# Patient Record
Sex: MALE | Race: OTHER | HISPANIC OR LATINO | URBAN - METROPOLITAN AREA
[De-identification: names, ages, dates, MRNs, and addresses within clinical notes are randomized per-mention and may not be internally consistent; named-entity substitution may affect disease eponyms.]

---

## 2019-06-04 ENCOUNTER — EMERGENCY (EMERGENCY)
Facility: HOSPITAL | Age: 34
LOS: 1 days | Discharge: HOME | End: 2019-06-04
Attending: EMERGENCY MEDICINE
Payer: MEDICAID

## 2019-06-04 VITALS
RESPIRATION RATE: 18 BRPM | WEIGHT: 199.96 LBS | SYSTOLIC BLOOD PRESSURE: 141 MMHG | TEMPERATURE: 98 F | OXYGEN SATURATION: 97 % | DIASTOLIC BLOOD PRESSURE: 105 MMHG | HEIGHT: 68 IN | HEART RATE: 119 BPM

## 2019-06-04 VITALS
OXYGEN SATURATION: 98 % | DIASTOLIC BLOOD PRESSURE: 99 MMHG | RESPIRATION RATE: 18 BRPM | HEART RATE: 96 BPM | SYSTOLIC BLOOD PRESSURE: 149 MMHG

## 2019-06-04 LAB
ABO RH CONFIRMATION: SIGNIFICANT CHANGE UP
ALBUMIN SERPL ELPH-MCNC: 5.2 G/DL — SIGNIFICANT CHANGE UP (ref 3.3–5.2)
ALP SERPL-CCNC: 73 U/L — SIGNIFICANT CHANGE UP (ref 40–120)
ALT FLD-CCNC: 34 U/L — SIGNIFICANT CHANGE UP
AMPHET UR-MCNC: NEGATIVE — SIGNIFICANT CHANGE UP
ANION GAP SERPL CALC-SCNC: 14 MMOL/L — SIGNIFICANT CHANGE UP (ref 5–17)
APPEARANCE UR: CLEAR — SIGNIFICANT CHANGE UP
APTT BLD: 27.9 SEC — SIGNIFICANT CHANGE UP (ref 27.5–36.3)
AST SERPL-CCNC: 79 U/L — HIGH
BACTERIA # UR AUTO: ABNORMAL
BARBITURATES UR SCN-MCNC: NEGATIVE — SIGNIFICANT CHANGE UP
BASOPHILS # BLD AUTO: 0 K/UL — SIGNIFICANT CHANGE UP (ref 0–0.2)
BASOPHILS NFR BLD AUTO: 0.1 % — SIGNIFICANT CHANGE UP (ref 0–2)
BENZODIAZ UR-MCNC: NEGATIVE — SIGNIFICANT CHANGE UP
BILIRUB SERPL-MCNC: 1 MG/DL — SIGNIFICANT CHANGE UP (ref 0.4–2)
BILIRUB UR-MCNC: NEGATIVE — SIGNIFICANT CHANGE UP
BLD GP AB SCN SERPL QL: SIGNIFICANT CHANGE UP
BUN SERPL-MCNC: 16 MG/DL — SIGNIFICANT CHANGE UP (ref 8–20)
CALCIUM SERPL-MCNC: 10.3 MG/DL — HIGH (ref 8.6–10.2)
CHLORIDE SERPL-SCNC: 94 MMOL/L — LOW (ref 98–107)
CO2 SERPL-SCNC: 26 MMOL/L — SIGNIFICANT CHANGE UP (ref 22–29)
COCAINE METAB.OTHER UR-MCNC: NEGATIVE — SIGNIFICANT CHANGE UP
COLOR SPEC: YELLOW — SIGNIFICANT CHANGE UP
CREAT SERPL-MCNC: 0.83 MG/DL — SIGNIFICANT CHANGE UP (ref 0.5–1.3)
DIFF PNL FLD: ABNORMAL
EOSINOPHIL # BLD AUTO: 0 K/UL — SIGNIFICANT CHANGE UP (ref 0–0.5)
EOSINOPHIL NFR BLD AUTO: 0.2 % — SIGNIFICANT CHANGE UP (ref 0–5)
EPI CELLS # UR: SIGNIFICANT CHANGE UP
GLUCOSE SERPL-MCNC: 99 MG/DL — SIGNIFICANT CHANGE UP (ref 70–115)
GLUCOSE UR QL: NEGATIVE MG/DL — SIGNIFICANT CHANGE UP
HCT VFR BLD CALC: 43.9 % — SIGNIFICANT CHANGE UP (ref 42–52)
HGB BLD-MCNC: 15.7 G/DL — SIGNIFICANT CHANGE UP (ref 14–18)
INR BLD: 0.94 RATIO — SIGNIFICANT CHANGE UP (ref 0.88–1.16)
KETONES UR-MCNC: ABNORMAL
LEUKOCYTE ESTERASE UR-ACNC: NEGATIVE — SIGNIFICANT CHANGE UP
LYMPHOCYTES # BLD AUTO: 1 K/UL — SIGNIFICANT CHANGE UP (ref 1–4.8)
LYMPHOCYTES # BLD AUTO: 12.6 % — LOW (ref 20–55)
MCHC RBC-ENTMCNC: 32.2 PG — HIGH (ref 27–31)
MCHC RBC-ENTMCNC: 35.8 G/DL — SIGNIFICANT CHANGE UP (ref 32–36)
MCV RBC AUTO: 90 FL — SIGNIFICANT CHANGE UP (ref 80–94)
METHADONE UR-MCNC: NEGATIVE — SIGNIFICANT CHANGE UP
MONOCYTES # BLD AUTO: 0.6 K/UL — SIGNIFICANT CHANGE UP (ref 0–0.8)
MONOCYTES NFR BLD AUTO: 7.2 % — SIGNIFICANT CHANGE UP (ref 3–10)
NEUTROPHILS # BLD AUTO: 6.5 K/UL — SIGNIFICANT CHANGE UP (ref 1.8–8)
NEUTROPHILS NFR BLD AUTO: 79.7 % — HIGH (ref 37–73)
NITRITE UR-MCNC: NEGATIVE — SIGNIFICANT CHANGE UP
OPIATES UR-MCNC: NEGATIVE — SIGNIFICANT CHANGE UP
PCP SPEC-MCNC: SIGNIFICANT CHANGE UP
PCP UR-MCNC: NEGATIVE — SIGNIFICANT CHANGE UP
PH UR: 5 — SIGNIFICANT CHANGE UP (ref 5–8)
PLATELET # BLD AUTO: 94 K/UL — LOW (ref 150–400)
POTASSIUM SERPL-MCNC: 5.3 MMOL/L — SIGNIFICANT CHANGE UP (ref 3.5–5.3)
POTASSIUM SERPL-SCNC: 5.3 MMOL/L — SIGNIFICANT CHANGE UP (ref 3.5–5.3)
PROT SERPL-MCNC: 8.7 G/DL — SIGNIFICANT CHANGE UP (ref 6.6–8.7)
PROT UR-MCNC: 100 MG/DL
PROTHROM AB SERPL-ACNC: 10.8 SEC — SIGNIFICANT CHANGE UP (ref 10–12.9)
RBC # BLD: 4.88 M/UL — SIGNIFICANT CHANGE UP (ref 4.6–6.2)
RBC # FLD: 14.4 % — SIGNIFICANT CHANGE UP (ref 11–15.6)
RBC CASTS # UR COMP ASSIST: ABNORMAL /HPF (ref 0–4)
SODIUM SERPL-SCNC: 134 MMOL/L — LOW (ref 135–145)
SP GR SPEC: 1.02 — SIGNIFICANT CHANGE UP (ref 1.01–1.02)
THC UR QL: NEGATIVE — SIGNIFICANT CHANGE UP
TROPONIN T SERPL-MCNC: <0.01 NG/ML — SIGNIFICANT CHANGE UP (ref 0–0.06)
TYPE + AB SCN PNL BLD: SIGNIFICANT CHANGE UP
UROBILINOGEN FLD QL: 1 MG/DL
WBC # BLD: 8.1 K/UL — SIGNIFICANT CHANGE UP (ref 4.8–10.8)
WBC # FLD AUTO: 8.1 K/UL — SIGNIFICANT CHANGE UP (ref 4.8–10.8)
WBC UR QL: SIGNIFICANT CHANGE UP

## 2019-06-04 PROCEDURE — 86901 BLOOD TYPING SEROLOGIC RH(D): CPT

## 2019-06-04 PROCEDURE — 70450 CT HEAD/BRAIN W/O DYE: CPT

## 2019-06-04 PROCEDURE — 86850 RBC ANTIBODY SCREEN: CPT

## 2019-06-04 PROCEDURE — 85730 THROMBOPLASTIN TIME PARTIAL: CPT

## 2019-06-04 PROCEDURE — 36415 COLL VENOUS BLD VENIPUNCTURE: CPT

## 2019-06-04 PROCEDURE — 93005 ELECTROCARDIOGRAM TRACING: CPT

## 2019-06-04 PROCEDURE — 99284 EMERGENCY DEPT VISIT MOD MDM: CPT | Mod: 25

## 2019-06-04 PROCEDURE — 72125 CT NECK SPINE W/O DYE: CPT

## 2019-06-04 PROCEDURE — 72125 CT NECK SPINE W/O DYE: CPT | Mod: 26

## 2019-06-04 PROCEDURE — 85027 COMPLETE CBC AUTOMATED: CPT

## 2019-06-04 PROCEDURE — 96374 THER/PROPH/DIAG INJ IV PUSH: CPT | Mod: XU

## 2019-06-04 PROCEDURE — 86900 BLOOD TYPING SEROLOGIC ABO: CPT

## 2019-06-04 PROCEDURE — 12002 RPR S/N/AX/GEN/TRNK2.6-7.5CM: CPT

## 2019-06-04 PROCEDURE — 70450 CT HEAD/BRAIN W/O DYE: CPT | Mod: 26

## 2019-06-04 PROCEDURE — 93010 ELECTROCARDIOGRAM REPORT: CPT

## 2019-06-04 PROCEDURE — 80053 COMPREHEN METABOLIC PANEL: CPT

## 2019-06-04 PROCEDURE — 81001 URINALYSIS AUTO W/SCOPE: CPT

## 2019-06-04 PROCEDURE — 84484 ASSAY OF TROPONIN QUANT: CPT

## 2019-06-04 PROCEDURE — 85610 PROTHROMBIN TIME: CPT

## 2019-06-04 PROCEDURE — 90715 TDAP VACCINE 7 YRS/> IM: CPT

## 2019-06-04 PROCEDURE — 80307 DRUG TEST PRSMV CHEM ANLYZR: CPT

## 2019-06-04 PROCEDURE — 90471 IMMUNIZATION ADMIN: CPT

## 2019-06-04 RX ORDER — LEVETIRACETAM 250 MG/1
1000 TABLET, FILM COATED ORAL ONCE
Refills: 0 | Status: COMPLETED | OUTPATIENT
Start: 2019-06-04 | End: 2019-06-04

## 2019-06-04 RX ORDER — TETANUS TOXOID, REDUCED DIPHTHERIA TOXOID AND ACELLULAR PERTUSSIS VACCINE, ADSORBED 5; 2.5; 8; 8; 2.5 [IU]/.5ML; [IU]/.5ML; UG/.5ML; UG/.5ML; UG/.5ML
0.5 SUSPENSION INTRAMUSCULAR ONCE
Refills: 0 | Status: COMPLETED | OUTPATIENT
Start: 2019-06-04 | End: 2019-06-04

## 2019-06-04 RX ORDER — LEVETIRACETAM 250 MG/1
1 TABLET, FILM COATED ORAL
Qty: 20 | Refills: 0
Start: 2019-06-04 | End: 2019-06-13

## 2019-06-04 RX ORDER — LEVETIRACETAM 250 MG/1
1 TABLET, FILM COATED ORAL
Qty: 28 | Refills: 0
Start: 2019-06-04 | End: 2019-06-17

## 2019-06-04 RX ADMIN — LEVETIRACETAM 400 MILLIGRAM(S): 250 TABLET, FILM COATED ORAL at 19:52

## 2019-06-04 RX ADMIN — TETANUS TOXOID, REDUCED DIPHTHERIA TOXOID AND ACELLULAR PERTUSSIS VACCINE, ADSORBED 0.5 MILLILITER(S): 5; 2.5; 8; 8; 2.5 SUSPENSION INTRAMUSCULAR at 15:46

## 2019-06-04 NOTE — ED ADULT NURSE REASSESSMENT NOTE - NS ED NURSE REASSESS COMMENT FT1
assumed pt care 1930. pt resting in stretcher. vss. no complaints at this time. neuro assessment wnl. nih =0

## 2019-06-04 NOTE — ED PROVIDER NOTE - PROGRESS NOTE DETAILS
The patient will follow up with Dr Roldan tomorrow in the office No driving until get cleared by Neurology

## 2019-06-04 NOTE — ED PROVIDER NOTE - OBJECTIVE STATEMENT
The patient is a 34 year old male presents with a fall secondary to syncope vs seizure.  The witness states that he fell and had tonic clonic movement.  The patient denies prodrome of dizziness, nor feeling sick prior to fall.  Currently, the patient has mild headache. No neck pain, No CP, No SOB, No abd pain, No motor No sensory loss  Denies illicit drugs

## 2019-06-04 NOTE — ED PROVIDER NOTE - CLINICAL SUMMARY MEDICAL DECISION MAKING FREE TEXT BOX
The patient had a new onset seizure and CT head and lab WNL and will dc home on keppra and follow up tomorrow in Neurology clinic

## 2019-06-04 NOTE — ED PROVIDER NOTE - CHPI ED SYMPTOMS NEG
no change in level of consciousness/no vomiting/no nausea/no numbness/no confusion/no dizziness/no fever/no blurred vision/no weakness

## 2019-06-04 NOTE — ED PROVIDER NOTE - ENMT, MLM
Airway patent, Nasal mucosa clear. Mouth with normal mucosa. Throat has no vesicles, no oropharyngeal exudates and uvula is midline. Scalp lac seen in the occiput

## 2019-06-04 NOTE — ED ADULT TRIAGE NOTE - CHIEF COMPLAINT QUOTE
pt reports a fall from standing height while at work, bystanders witnessed brief clonic tonic activity as per EMS. pt arrive a&ox3, denies any hx cardiac, seizures. no blood thinners reported. as per EMS lac to posterior scalp.

## 2019-06-05 PROBLEM — Z00.00 ENCOUNTER FOR PREVENTIVE HEALTH EXAMINATION: Status: ACTIVE | Noted: 2019-06-05

## 2019-06-06 ENCOUNTER — APPOINTMENT (OUTPATIENT)
Dept: NEUROLOGY | Facility: CLINIC | Age: 34
End: 2019-06-06
Payer: MEDICAID

## 2019-06-06 VITALS
BODY MASS INDEX: 30.31 KG/M2 | WEIGHT: 200 LBS | SYSTOLIC BLOOD PRESSURE: 130 MMHG | DIASTOLIC BLOOD PRESSURE: 80 MMHG | HEIGHT: 68 IN

## 2019-06-06 DIAGNOSIS — R56.9 UNSPECIFIED CONVULSIONS: ICD-10-CM

## 2019-06-06 PROCEDURE — 99204 OFFICE O/P NEW MOD 45 MIN: CPT

## 2019-06-06 RX ORDER — LEVETIRACETAM 500 MG/1
500 TABLET, FILM COATED ORAL
Refills: 0 | Status: ACTIVE | COMMUNITY

## 2019-06-06 RX ORDER — LEVETIRACETAM 500 MG/1
500 TABLET, FILM COATED ORAL TWICE DAILY
Qty: 60 | Refills: 5 | Status: ACTIVE | COMMUNITY
Start: 2019-06-06 | End: 1900-01-01

## 2019-06-11 ENCOUNTER — EMERGENCY (EMERGENCY)
Facility: HOSPITAL | Age: 34
LOS: 1 days | Discharge: HOME | End: 2019-06-11
Attending: STUDENT IN AN ORGANIZED HEALTH CARE EDUCATION/TRAINING PROGRAM
Payer: MEDICAID

## 2019-06-11 VITALS
HEART RATE: 88 BPM | TEMPERATURE: 99 F | RESPIRATION RATE: 18 BRPM | SYSTOLIC BLOOD PRESSURE: 144 MMHG | DIASTOLIC BLOOD PRESSURE: 90 MMHG | OXYGEN SATURATION: 99 % | HEIGHT: 68 IN | WEIGHT: 199.96 LBS

## 2019-06-11 PROCEDURE — G0463: CPT

## 2019-06-12 NOTE — ED PROVIDER NOTE - ENMT, MLM
Airway patent. Nasal mucosa clear.  Mouth with normal mucosa. Neck supple, FROM. 5 removed from parietal scalp, site well appearing, no erythema, no discharge.

## 2019-06-12 NOTE — ED PROVIDER NOTE - OBJECTIVE STATEMENT
35 y/o M pt presents to the ED for staple removal.  Pt had staples placed to his parietal scalp June 4th, and was advised to return to the ED for staple removal.  Denies erythema, itching, drainage, fever, chills.  No further acute complaints at this time.

## 2019-06-18 ENCOUNTER — APPOINTMENT (OUTPATIENT)
Dept: NEUROLOGY | Facility: CLINIC | Age: 34
End: 2019-06-18

## 2019-07-23 ENCOUNTER — APPOINTMENT (OUTPATIENT)
Dept: NEUROLOGY | Facility: CLINIC | Age: 34
End: 2019-07-23

## 2019-08-05 PROBLEM — R56.9 SEIZURES: Status: ACTIVE | Noted: 2019-06-06

## 2023-07-28 ENCOUNTER — INPATIENT (INPATIENT)
Facility: HOSPITAL | Age: 38
LOS: 0 days | DRG: 871 | End: 2023-07-29
Attending: STUDENT IN AN ORGANIZED HEALTH CARE EDUCATION/TRAINING PROGRAM | Admitting: STUDENT IN AN ORGANIZED HEALTH CARE EDUCATION/TRAINING PROGRAM
Payer: COMMERCIAL

## 2023-07-28 PROCEDURE — 36556 INSERT NON-TUNNEL CV CATH: CPT

## 2023-07-28 PROCEDURE — 99291 CRITICAL CARE FIRST HOUR: CPT | Mod: 25

## 2023-07-28 PROCEDURE — 99292 CRITICAL CARE ADDL 30 MIN: CPT | Mod: 25

## 2023-07-29 VITALS — HEART RATE: 52 BPM | WEIGHT: 154.32 LBS

## 2023-07-29 DIAGNOSIS — I46.9 CARDIAC ARREST, CAUSE UNSPECIFIED: ICD-10-CM

## 2023-07-29 LAB
ABO RH CONFIRMATION: SIGNIFICANT CHANGE UP
ABO RH CONFIRMATION: SIGNIFICANT CHANGE UP
ALBUMIN SERPL ELPH-MCNC: 2.1 G/DL — LOW (ref 3.3–5.2)
ALBUMIN SERPL ELPH-MCNC: 2.1 G/DL — LOW (ref 3.3–5.2)
ALBUMIN SERPL ELPH-MCNC: 3.1 G/DL — LOW (ref 3.3–5.2)
ALBUMIN SERPL ELPH-MCNC: 3.1 G/DL — LOW (ref 3.3–5.2)
ALP SERPL-CCNC: 417 U/L — HIGH (ref 40–120)
ALP SERPL-CCNC: 417 U/L — HIGH (ref 40–120)
ALP SERPL-CCNC: 493 U/L — HIGH (ref 40–120)
ALP SERPL-CCNC: 493 U/L — HIGH (ref 40–120)
ALT FLD-CCNC: 105 U/L — HIGH
ALT FLD-CCNC: 105 U/L — HIGH
ALT FLD-CCNC: 257 U/L — HIGH
ALT FLD-CCNC: 257 U/L — HIGH
ANION GAP SERPL CALC-SCNC: 49 MMOL/L — HIGH (ref 5–17)
ANION GAP SERPL CALC-SCNC: 49 MMOL/L — HIGH (ref 5–17)
ANION GAP SERPL CALC-SCNC: SIGNIFICANT CHANGE UP MMOL/L (ref 5–17)
ANION GAP SERPL CALC-SCNC: SIGNIFICANT CHANGE UP MMOL/L (ref 5–17)
ANISOCYTOSIS BLD QL: SLIGHT — SIGNIFICANT CHANGE UP
APTT BLD: 66.7 SEC — HIGH (ref 24.5–35.6)
APTT BLD: 66.7 SEC — HIGH (ref 24.5–35.6)
APTT BLD: 93.7 SEC — HIGH (ref 24.5–35.6)
APTT BLD: 93.7 SEC — HIGH (ref 24.5–35.6)
AST SERPL-CCNC: 1113 U/L — HIGH
AST SERPL-CCNC: 1113 U/L — HIGH
AST SERPL-CCNC: 347 U/L — HIGH
AST SERPL-CCNC: 347 U/L — HIGH
BASOPHILS # BLD AUTO: 0 K/UL — SIGNIFICANT CHANGE UP (ref 0–0.2)
BASOPHILS NFR BLD AUTO: 0 % — SIGNIFICANT CHANGE UP (ref 0–2)
BILIRUB SERPL-MCNC: 1.8 MG/DL — SIGNIFICANT CHANGE UP (ref 0.4–2)
BILIRUB SERPL-MCNC: 1.8 MG/DL — SIGNIFICANT CHANGE UP (ref 0.4–2)
BILIRUB SERPL-MCNC: 2.2 MG/DL — HIGH (ref 0.4–2)
BILIRUB SERPL-MCNC: 2.2 MG/DL — HIGH (ref 0.4–2)
BLD GP AB SCN SERPL QL: SIGNIFICANT CHANGE UP
BLD GP AB SCN SERPL QL: SIGNIFICANT CHANGE UP
BLOOD GAS COMMENTS ARTERIAL: SIGNIFICANT CHANGE UP
BLOOD GAS COMMENTS ARTERIAL: SIGNIFICANT CHANGE UP
BUN SERPL-MCNC: 17.1 MG/DL — SIGNIFICANT CHANGE UP (ref 8–20)
BUN SERPL-MCNC: 17.1 MG/DL — SIGNIFICANT CHANGE UP (ref 8–20)
BUN SERPL-MCNC: 18.4 MG/DL — SIGNIFICANT CHANGE UP (ref 8–20)
BUN SERPL-MCNC: 18.4 MG/DL — SIGNIFICANT CHANGE UP (ref 8–20)
BURR CELLS BLD QL SMEAR: PRESENT — SIGNIFICANT CHANGE UP
CALCIUM SERPL-MCNC: 10 MG/DL — SIGNIFICANT CHANGE UP (ref 8.4–10.5)
CALCIUM SERPL-MCNC: 10 MG/DL — SIGNIFICANT CHANGE UP (ref 8.4–10.5)
CALCIUM SERPL-MCNC: 6.2 MG/DL — CRITICAL LOW (ref 8.4–10.5)
CALCIUM SERPL-MCNC: 6.2 MG/DL — CRITICAL LOW (ref 8.4–10.5)
CHLORIDE SERPL-SCNC: 80 MMOL/L — LOW (ref 96–108)
CHLORIDE SERPL-SCNC: 80 MMOL/L — LOW (ref 96–108)
CHLORIDE SERPL-SCNC: 85 MMOL/L — LOW (ref 96–108)
CHLORIDE SERPL-SCNC: 85 MMOL/L — LOW (ref 96–108)
CO2 SERPL-SCNC: 9 MMOL/L — CRITICAL LOW (ref 22–29)
CO2 SERPL-SCNC: 9 MMOL/L — CRITICAL LOW (ref 22–29)
CO2 SERPL-SCNC: <6 MMOL/L — CRITICAL LOW (ref 22–29)
CO2 SERPL-SCNC: <6 MMOL/L — CRITICAL LOW (ref 22–29)
CREAT SERPL-MCNC: 3.74 MG/DL — HIGH (ref 0.5–1.3)
CREAT SERPL-MCNC: 3.74 MG/DL — HIGH (ref 0.5–1.3)
CREAT SERPL-MCNC: 4.11 MG/DL — HIGH (ref 0.5–1.3)
CREAT SERPL-MCNC: 4.11 MG/DL — HIGH (ref 0.5–1.3)
EGFR: 18 ML/MIN/1.73M2 — LOW
EGFR: 18 ML/MIN/1.73M2 — LOW
EGFR: 20 ML/MIN/1.73M2 — LOW
EGFR: 20 ML/MIN/1.73M2 — LOW
EOSINOPHIL # BLD AUTO: 0 K/UL — SIGNIFICANT CHANGE UP (ref 0–0.5)
EOSINOPHIL # BLD AUTO: 0 K/UL — SIGNIFICANT CHANGE UP (ref 0–0.5)
EOSINOPHIL # BLD AUTO: 0.9 K/UL — HIGH (ref 0–0.5)
EOSINOPHIL # BLD AUTO: 0.9 K/UL — HIGH (ref 0–0.5)
EOSINOPHIL NFR BLD AUTO: 0 % — SIGNIFICANT CHANGE UP (ref 0–6)
EOSINOPHIL NFR BLD AUTO: 0 % — SIGNIFICANT CHANGE UP (ref 0–6)
EOSINOPHIL NFR BLD AUTO: 2.5 % — SIGNIFICANT CHANGE UP (ref 0–6)
EOSINOPHIL NFR BLD AUTO: 2.5 % — SIGNIFICANT CHANGE UP (ref 0–6)
ETHANOL SERPL-MCNC: <10 MG/DL — SIGNIFICANT CHANGE UP (ref 0–9)
ETHANOL SERPL-MCNC: <10 MG/DL — SIGNIFICANT CHANGE UP (ref 0–9)
GAS PNL BLDA: SIGNIFICANT CHANGE UP
GIANT PLATELETS BLD QL SMEAR: PRESENT — SIGNIFICANT CHANGE UP
GLUCOSE SERPL-MCNC: 310 MG/DL — HIGH (ref 70–99)
GLUCOSE SERPL-MCNC: 310 MG/DL — HIGH (ref 70–99)
GLUCOSE SERPL-MCNC: 440 MG/DL — HIGH (ref 70–99)
GLUCOSE SERPL-MCNC: 440 MG/DL — HIGH (ref 70–99)
HCT VFR BLD CALC: 34.6 % — LOW (ref 39–50)
HCT VFR BLD CALC: 34.6 % — LOW (ref 39–50)
HCT VFR BLD CALC: 42.8 % — SIGNIFICANT CHANGE UP (ref 39–50)
HCT VFR BLD CALC: 42.8 % — SIGNIFICANT CHANGE UP (ref 39–50)
HGB BLD-MCNC: 11 G/DL — LOW (ref 13–17)
HGB BLD-MCNC: 11 G/DL — LOW (ref 13–17)
HGB BLD-MCNC: 13.1 G/DL — SIGNIFICANT CHANGE UP (ref 13–17)
HGB BLD-MCNC: 13.1 G/DL — SIGNIFICANT CHANGE UP (ref 13–17)
INR BLD: 1.56 RATIO — HIGH (ref 0.85–1.18)
INR BLD: 1.56 RATIO — HIGH (ref 0.85–1.18)
INR BLD: 3.54 RATIO — HIGH (ref 0.85–1.18)
INR BLD: 3.54 RATIO — HIGH (ref 0.85–1.18)
LACTATE SERPL-SCNC: 31.9 MMOL/L — CRITICAL HIGH (ref 0.5–2)
LACTATE SERPL-SCNC: 31.9 MMOL/L — CRITICAL HIGH (ref 0.5–2)
LACTATE SERPL-SCNC: 32 MMOL/L — CRITICAL HIGH (ref 0.5–2)
LACTATE SERPL-SCNC: 32 MMOL/L — CRITICAL HIGH (ref 0.5–2)
LYMPHOCYTES # BLD AUTO: 10.61 K/UL — HIGH (ref 1–3.3)
LYMPHOCYTES # BLD AUTO: 10.61 K/UL — HIGH (ref 1–3.3)
LYMPHOCYTES # BLD AUTO: 22 % — SIGNIFICANT CHANGE UP (ref 13–44)
LYMPHOCYTES # BLD AUTO: 22 % — SIGNIFICANT CHANGE UP (ref 13–44)
LYMPHOCYTES # BLD AUTO: 3.07 K/UL — SIGNIFICANT CHANGE UP (ref 1–3.3)
LYMPHOCYTES # BLD AUTO: 3.07 K/UL — SIGNIFICANT CHANGE UP (ref 1–3.3)
LYMPHOCYTES # BLD AUTO: 8.5 % — LOW (ref 13–44)
LYMPHOCYTES # BLD AUTO: 8.5 % — LOW (ref 13–44)
MACROCYTES BLD QL: SLIGHT — SIGNIFICANT CHANGE UP
MAGNESIUM SERPL-MCNC: 4.3 MG/DL — HIGH (ref 1.6–2.6)
MAGNESIUM SERPL-MCNC: 4.3 MG/DL — HIGH (ref 1.6–2.6)
MANUAL SMEAR VERIFICATION: SIGNIFICANT CHANGE UP
MCHC RBC-ENTMCNC: 30.6 GM/DL — LOW (ref 32–36)
MCHC RBC-ENTMCNC: 30.6 GM/DL — LOW (ref 32–36)
MCHC RBC-ENTMCNC: 31.8 GM/DL — LOW (ref 32–36)
MCHC RBC-ENTMCNC: 31.8 GM/DL — LOW (ref 32–36)
MCHC RBC-ENTMCNC: 33.3 PG — SIGNIFICANT CHANGE UP (ref 27–34)
MCHC RBC-ENTMCNC: 33.3 PG — SIGNIFICANT CHANGE UP (ref 27–34)
MCHC RBC-ENTMCNC: 34 PG — SIGNIFICANT CHANGE UP (ref 27–34)
MCHC RBC-ENTMCNC: 34 PG — SIGNIFICANT CHANGE UP (ref 27–34)
MCV RBC AUTO: 106.8 FL — HIGH (ref 80–100)
MCV RBC AUTO: 106.8 FL — HIGH (ref 80–100)
MCV RBC AUTO: 108.9 FL — HIGH (ref 80–100)
MCV RBC AUTO: 108.9 FL — HIGH (ref 80–100)
METAMYELOCYTES # FLD: 1.7 % — HIGH (ref 0–0)
MONOCYTES # BLD AUTO: 1.51 K/UL — HIGH (ref 0–0.9)
MONOCYTES # BLD AUTO: 1.51 K/UL — HIGH (ref 0–0.9)
MONOCYTES # BLD AUTO: 1.64 K/UL — HIGH (ref 0–0.9)
MONOCYTES # BLD AUTO: 1.64 K/UL — HIGH (ref 0–0.9)
MONOCYTES NFR BLD AUTO: 3.4 % — SIGNIFICANT CHANGE UP (ref 2–14)
MONOCYTES NFR BLD AUTO: 3.4 % — SIGNIFICANT CHANGE UP (ref 2–14)
MONOCYTES NFR BLD AUTO: 4.2 % — SIGNIFICANT CHANGE UP (ref 2–14)
MONOCYTES NFR BLD AUTO: 4.2 % — SIGNIFICANT CHANGE UP (ref 2–14)
NEUTROPHILS # BLD AUTO: 29.35 K/UL — HIGH (ref 1.8–7.4)
NEUTROPHILS # BLD AUTO: 29.35 K/UL — HIGH (ref 1.8–7.4)
NEUTROPHILS # BLD AUTO: 34.71 K/UL — HIGH (ref 1.8–7.4)
NEUTROPHILS # BLD AUTO: 34.71 K/UL — HIGH (ref 1.8–7.4)
NEUTROPHILS NFR BLD AUTO: 68.6 % — SIGNIFICANT CHANGE UP (ref 43–77)
NEUTROPHILS NFR BLD AUTO: 68.6 % — SIGNIFICANT CHANGE UP (ref 43–77)
NEUTROPHILS NFR BLD AUTO: 79.7 % — HIGH (ref 43–77)
NEUTROPHILS NFR BLD AUTO: 79.7 % — HIGH (ref 43–77)
NEUTS BAND # BLD: 1.7 % — SIGNIFICANT CHANGE UP (ref 0–8)
NEUTS BAND # BLD: 1.7 % — SIGNIFICANT CHANGE UP (ref 0–8)
NEUTS BAND # BLD: 3.4 % — SIGNIFICANT CHANGE UP (ref 0–8)
NEUTS BAND # BLD: 3.4 % — SIGNIFICANT CHANGE UP (ref 0–8)
NRBC # BLD: 1 /100 — HIGH (ref 0–0)
NRBC # BLD: 1 /100 — HIGH (ref 0–0)
NT-PROBNP SERPL-SCNC: 4749 PG/ML — HIGH (ref 0–300)
NT-PROBNP SERPL-SCNC: 4749 PG/ML — HIGH (ref 0–300)
PCO2 BLDA: 46 MMHG — SIGNIFICANT CHANGE UP (ref 35–48)
PCO2 BLDA: 46 MMHG — SIGNIFICANT CHANGE UP (ref 35–48)
PH BLDA: <6.942 — CRITICAL LOW (ref 7.35–7.45)
PH BLDA: <6.942 — CRITICAL LOW (ref 7.35–7.45)
PHOSPHATE SERPL-MCNC: 13 MG/DL — HIGH (ref 2.4–4.7)
PHOSPHATE SERPL-MCNC: 13 MG/DL — HIGH (ref 2.4–4.7)
PLAT MORPH BLD: ABNORMAL
PLAT MORPH BLD: ABNORMAL
PLAT MORPH BLD: NORMAL — SIGNIFICANT CHANGE UP
PLAT MORPH BLD: NORMAL — SIGNIFICANT CHANGE UP
PLATELET # BLD AUTO: 174 K/UL — SIGNIFICANT CHANGE UP (ref 150–400)
PLATELET # BLD AUTO: 174 K/UL — SIGNIFICANT CHANGE UP (ref 150–400)
PLATELET # BLD AUTO: 259 K/UL — SIGNIFICANT CHANGE UP (ref 150–400)
PLATELET # BLD AUTO: 259 K/UL — SIGNIFICANT CHANGE UP (ref 150–400)
PO2 BLDA: 160 MMHG — HIGH (ref 83–108)
PO2 BLDA: 160 MMHG — HIGH (ref 83–108)
POIKILOCYTOSIS BLD QL AUTO: SLIGHT — SIGNIFICANT CHANGE UP
POLYCHROMASIA BLD QL SMEAR: SLIGHT — SIGNIFICANT CHANGE UP
POTASSIUM SERPL-MCNC: 3.4 MMOL/L — LOW (ref 3.5–5.3)
POTASSIUM SERPL-MCNC: 3.4 MMOL/L — LOW (ref 3.5–5.3)
POTASSIUM SERPL-MCNC: 4.2 MMOL/L — SIGNIFICANT CHANGE UP (ref 3.5–5.3)
POTASSIUM SERPL-MCNC: 4.2 MMOL/L — SIGNIFICANT CHANGE UP (ref 3.5–5.3)
POTASSIUM SERPL-SCNC: 3.4 MMOL/L — LOW (ref 3.5–5.3)
POTASSIUM SERPL-SCNC: 3.4 MMOL/L — LOW (ref 3.5–5.3)
POTASSIUM SERPL-SCNC: 4.2 MMOL/L — SIGNIFICANT CHANGE UP (ref 3.5–5.3)
POTASSIUM SERPL-SCNC: 4.2 MMOL/L — SIGNIFICANT CHANGE UP (ref 3.5–5.3)
PROT SERPL-MCNC: 4 G/DL — LOW (ref 6.6–8.7)
PROT SERPL-MCNC: 4 G/DL — LOW (ref 6.6–8.7)
PROT SERPL-MCNC: 6.2 G/DL — LOW (ref 6.6–8.7)
PROT SERPL-MCNC: 6.2 G/DL — LOW (ref 6.6–8.7)
PROTHROM AB SERPL-ACNC: 17.1 SEC — HIGH (ref 9.5–13)
PROTHROM AB SERPL-ACNC: 17.1 SEC — HIGH (ref 9.5–13)
PROTHROM AB SERPL-ACNC: 37.9 SEC — HIGH (ref 9.5–13)
PROTHROM AB SERPL-ACNC: 37.9 SEC — HIGH (ref 9.5–13)
RBC # BLD: 3.24 M/UL — LOW (ref 4.2–5.8)
RBC # BLD: 3.24 M/UL — LOW (ref 4.2–5.8)
RBC # BLD: 3.93 M/UL — LOW (ref 4.2–5.8)
RBC # BLD: 3.93 M/UL — LOW (ref 4.2–5.8)
RBC # FLD: 14.6 % — HIGH (ref 10.3–14.5)
RBC # FLD: 14.6 % — HIGH (ref 10.3–14.5)
RBC # FLD: 15.5 % — HIGH (ref 10.3–14.5)
RBC # FLD: 15.5 % — HIGH (ref 10.3–14.5)
RBC BLD AUTO: ABNORMAL
SAO2 % BLDA: 94.7 % — SIGNIFICANT CHANGE UP (ref 94–98)
SAO2 % BLDA: 94.7 % — SIGNIFICANT CHANGE UP (ref 94–98)
SMUDGE CELLS # BLD: PRESENT — SIGNIFICANT CHANGE UP
SMUDGE CELLS # BLD: PRESENT — SIGNIFICANT CHANGE UP
SODIUM SERPL-SCNC: 138 MMOL/L — SIGNIFICANT CHANGE UP (ref 135–145)
SODIUM SERPL-SCNC: 138 MMOL/L — SIGNIFICANT CHANGE UP (ref 135–145)
SODIUM SERPL-SCNC: 140 MMOL/L — SIGNIFICANT CHANGE UP (ref 135–145)
SODIUM SERPL-SCNC: 140 MMOL/L — SIGNIFICANT CHANGE UP (ref 135–145)
TROPONIN T SERPL-MCNC: 0.01 NG/ML — SIGNIFICANT CHANGE UP (ref 0–0.06)
TROPONIN T SERPL-MCNC: 0.01 NG/ML — SIGNIFICANT CHANGE UP (ref 0–0.06)
VARIANT LYMPHS # BLD: 0.9 % — SIGNIFICANT CHANGE UP (ref 0–6)
VARIANT LYMPHS # BLD: 0.9 % — SIGNIFICANT CHANGE UP (ref 0–6)
VARIANT LYMPHS # BLD: 1.7 % — SIGNIFICANT CHANGE UP (ref 0–6)
VARIANT LYMPHS # BLD: 1.7 % — SIGNIFICANT CHANGE UP (ref 0–6)
WBC # BLD: 36.06 K/UL — HIGH (ref 3.8–10.5)
WBC # BLD: 36.06 K/UL — HIGH (ref 3.8–10.5)
WBC # BLD: 48.21 K/UL — CRITICAL HIGH (ref 3.8–10.5)
WBC # BLD: 48.21 K/UL — CRITICAL HIGH (ref 3.8–10.5)
WBC # FLD AUTO: 36.06 K/UL — HIGH (ref 3.8–10.5)
WBC # FLD AUTO: 36.06 K/UL — HIGH (ref 3.8–10.5)
WBC # FLD AUTO: 48.21 K/UL — CRITICAL HIGH (ref 3.8–10.5)
WBC # FLD AUTO: 48.21 K/UL — CRITICAL HIGH (ref 3.8–10.5)

## 2023-07-29 PROCEDURE — 93010 ELECTROCARDIOGRAM REPORT: CPT

## 2023-07-29 PROCEDURE — 71045 X-RAY EXAM CHEST 1 VIEW: CPT | Mod: 26

## 2023-07-29 PROCEDURE — 70450 CT HEAD/BRAIN W/O DYE: CPT | Mod: 26

## 2023-07-29 PROCEDURE — 71250 CT THORAX DX C-: CPT | Mod: 26

## 2023-07-29 PROCEDURE — 99291 CRITICAL CARE FIRST HOUR: CPT | Mod: 25

## 2023-07-29 PROCEDURE — 74176 CT ABD & PELVIS W/O CONTRAST: CPT | Mod: 26

## 2023-07-29 RX ORDER — NOREPINEPHRINE BITARTRATE/D5W 8 MG/250ML
0.05 PLASTIC BAG, INJECTION (ML) INTRAVENOUS
Qty: 8 | Refills: 0 | Status: DISCONTINUED | OUTPATIENT
Start: 2023-07-29 | End: 2023-07-29

## 2023-07-29 RX ORDER — PANTOPRAZOLE SODIUM 20 MG/1
40 TABLET, DELAYED RELEASE ORAL ONCE
Refills: 0 | Status: DISCONTINUED | OUTPATIENT
Start: 2023-07-29 | End: 2023-07-29

## 2023-07-29 RX ORDER — EPINEPHRINE 0.3 MG/.3ML
0.3 INJECTION INTRAMUSCULAR; SUBCUTANEOUS
Qty: 4 | Refills: 0 | Status: DISCONTINUED | OUTPATIENT
Start: 2023-07-29 | End: 2023-07-29

## 2023-07-29 RX ORDER — VASOPRESSIN 20 [USP'U]/ML
0.04 INJECTION INTRAVENOUS
Qty: 40 | Refills: 0 | Status: DISCONTINUED | OUTPATIENT
Start: 2023-07-29 | End: 2023-07-29

## 2023-07-29 RX ORDER — NOREPINEPHRINE BITARTRATE/D5W 8 MG/250ML
0.05 PLASTIC BAG, INJECTION (ML) INTRAVENOUS
Qty: 32 | Refills: 0 | Status: DISCONTINUED | OUTPATIENT
Start: 2023-07-29 | End: 2023-07-29

## 2023-07-29 RX ORDER — SODIUM BICARBONATE 1 MEQ/ML
0.21 SYRINGE (ML) INTRAVENOUS
Qty: 150 | Refills: 0 | Status: DISCONTINUED | OUTPATIENT
Start: 2023-07-29 | End: 2023-07-29

## 2023-07-29 RX ORDER — SODIUM CHLORIDE 9 MG/ML
1000 INJECTION, SOLUTION INTRAVENOUS ONCE
Refills: 0 | Status: COMPLETED | OUTPATIENT
Start: 2023-07-29 | End: 2023-07-29

## 2023-07-29 RX ORDER — HYDROMORPHONE HYDROCHLORIDE 2 MG/ML
2 INJECTION INTRAMUSCULAR; INTRAVENOUS; SUBCUTANEOUS ONCE
Refills: 0 | Status: DISCONTINUED | OUTPATIENT
Start: 2023-07-29 | End: 2023-07-29

## 2023-07-29 RX ORDER — CEFTRIAXONE 500 MG/1
1000 INJECTION, POWDER, FOR SOLUTION INTRAMUSCULAR; INTRAVENOUS ONCE
Refills: 0 | Status: DISCONTINUED | OUTPATIENT
Start: 2023-07-29 | End: 2023-07-29

## 2023-07-29 RX ORDER — PIPERACILLIN AND TAZOBACTAM 4; .5 G/20ML; G/20ML
3.38 INJECTION, POWDER, LYOPHILIZED, FOR SOLUTION INTRAVENOUS EVERY 8 HOURS
Refills: 0 | Status: DISCONTINUED | OUTPATIENT
Start: 2023-07-29 | End: 2023-07-29

## 2023-07-29 RX ORDER — CHLORHEXIDINE GLUCONATE 213 G/1000ML
15 SOLUTION TOPICAL EVERY 12 HOURS
Refills: 0 | Status: DISCONTINUED | OUTPATIENT
Start: 2023-07-29 | End: 2023-07-29

## 2023-07-29 RX ORDER — EPINEPHRINE 0.3 MG/.3ML
0.05 INJECTION INTRAMUSCULAR; SUBCUTANEOUS
Qty: 8 | Refills: 0 | Status: DISCONTINUED | OUTPATIENT
Start: 2023-07-29 | End: 2023-07-29

## 2023-07-29 RX ORDER — CHLORHEXIDINE GLUCONATE 213 G/1000ML
1 SOLUTION TOPICAL
Refills: 0 | Status: DISCONTINUED | OUTPATIENT
Start: 2023-07-29 | End: 2023-07-29

## 2023-07-29 RX ORDER — NOREPINEPHRINE BITARTRATE/D5W 8 MG/250ML
0.05 PLASTIC BAG, INJECTION (ML) INTRAVENOUS
Qty: 16 | Refills: 0 | Status: DISCONTINUED | OUTPATIENT
Start: 2023-07-29 | End: 2023-07-29

## 2023-07-29 RX ORDER — LEVETIRACETAM 250 MG/1
1000 TABLET, FILM COATED ORAL EVERY 12 HOURS
Refills: 0 | Status: DISCONTINUED | OUTPATIENT
Start: 2023-07-29 | End: 2023-07-29

## 2023-07-29 RX ORDER — PHENYLEPHRINE HYDROCHLORIDE 10 MG/ML
1000 INJECTION INTRAVENOUS ONCE
Refills: 0 | Status: COMPLETED | OUTPATIENT
Start: 2023-07-29 | End: 2023-07-29

## 2023-07-29 RX ORDER — SODIUM BICARBONATE 1 MEQ/ML
50 SYRINGE (ML) INTRAVENOUS ONCE
Refills: 0 | Status: COMPLETED | OUTPATIENT
Start: 2023-07-29 | End: 2023-07-29

## 2023-07-29 RX ORDER — FENTANYL CITRATE 50 UG/ML
0.5 INJECTION INTRAVENOUS
Qty: 2500 | Refills: 0 | Status: DISCONTINUED | OUTPATIENT
Start: 2023-07-29 | End: 2023-07-29

## 2023-07-29 RX ADMIN — Medication 3.28 MICROGRAM(S)/KG/MIN: at 13:37

## 2023-07-29 RX ADMIN — Medication 3.28 MICROGRAM(S)/KG/MIN: at 11:55

## 2023-07-29 RX ADMIN — Medication 3.28 MICROGRAM(S)/KG/MIN: at 07:59

## 2023-07-29 RX ADMIN — SODIUM CHLORIDE 1000 MILLILITER(S): 9 INJECTION, SOLUTION INTRAVENOUS at 04:57

## 2023-07-29 RX ADMIN — Medication 3.28 MICROGRAM(S)/KG/MIN: at 05:45

## 2023-07-29 RX ADMIN — EPINEPHRINE 78.8 MICROGRAM(S)/KG/MIN: 0.3 INJECTION INTRAMUSCULAR; SUBCUTANEOUS at 05:02

## 2023-07-29 RX ADMIN — Medication 50 MILLIEQUIVALENT(S): at 00:20

## 2023-07-29 RX ADMIN — Medication 3.28 MICROGRAM(S)/KG/MIN: at 19:50

## 2023-07-29 RX ADMIN — HYDROMORPHONE HYDROCHLORIDE 2 MILLIGRAM(S): 2 INJECTION INTRAMUSCULAR; INTRAVENOUS; SUBCUTANEOUS at 07:59

## 2023-07-29 RX ADMIN — FENTANYL CITRATE 3.5 MICROGRAM(S)/KG/HR: 50 INJECTION INTRAVENOUS at 11:55

## 2023-07-29 RX ADMIN — LEVETIRACETAM 400 MILLIGRAM(S): 250 TABLET, FILM COATED ORAL at 01:15

## 2023-07-29 RX ADMIN — VASOPRESSIN 6 UNIT(S)/MIN: 20 INJECTION INTRAVENOUS at 16:23

## 2023-07-29 RX ADMIN — HYDROMORPHONE HYDROCHLORIDE 2 MILLIGRAM(S): 2 INJECTION INTRAMUSCULAR; INTRAVENOUS; SUBCUTANEOUS at 08:15

## 2023-07-29 RX ADMIN — Medication 3.28 MICROGRAM(S)/KG/MIN: at 06:27

## 2023-07-29 RX ADMIN — PIPERACILLIN AND TAZOBACTAM 25 GRAM(S): 4; .5 INJECTION, POWDER, LYOPHILIZED, FOR SOLUTION INTRAVENOUS at 13:37

## 2023-07-29 RX ADMIN — PHENYLEPHRINE HYDROCHLORIDE 1000 MICROGRAM(S): 10 INJECTION INTRAVENOUS at 00:10

## 2023-07-29 RX ADMIN — CHLORHEXIDINE GLUCONATE 15 MILLILITER(S): 213 SOLUTION TOPICAL at 05:46

## 2023-07-29 RX ADMIN — Medication 3.28 MICROGRAM(S)/KG/MIN: at 16:23

## 2023-07-29 RX ADMIN — VASOPRESSIN 6 UNIT(S)/MIN: 20 INJECTION INTRAVENOUS at 13:37

## 2023-07-29 RX ADMIN — PIPERACILLIN AND TAZOBACTAM 25 GRAM(S): 4; .5 INJECTION, POWDER, LYOPHILIZED, FOR SOLUTION INTRAVENOUS at 05:45

## 2023-07-29 RX ADMIN — CHLORHEXIDINE GLUCONATE 15 MILLILITER(S): 213 SOLUTION TOPICAL at 16:22

## 2023-07-29 RX ADMIN — Medication 6.56 MICROGRAM(S)/KG/MIN: at 01:16

## 2023-07-29 RX ADMIN — Medication 3.28 MICROGRAM(S)/KG/MIN: at 04:57

## 2023-07-29 RX ADMIN — Medication 100 MEQ/KG/HR: at 04:57

## 2023-07-29 RX ADMIN — LEVETIRACETAM 400 MILLIGRAM(S): 250 TABLET, FILM COATED ORAL at 17:28

## 2023-07-29 RX ADMIN — CHLORHEXIDINE GLUCONATE 1 APPLICATION(S): 213 SOLUTION TOPICAL at 05:45

## 2023-07-29 NOTE — ED PROCEDURE NOTE - CPROC ED TIME OUT STATEMENT1
“Patient's name, , procedure and correct site were confirmed during the [place] Timeout.” “Patient's name, , procedure and correct site were confirmed during the [place] Timeout.”

## 2023-07-29 NOTE — INITIAL ORGAN DONATION REFERRAL - NSLIVEONNYREPRESENTATIVENAME
[id number] [facility] [id number] [facility]

## 2023-07-29 NOTE — ED PROVIDER NOTE - ATTENDING CONTRIBUTION TO CARE
I have personally provided __110_ minutes of critical care time exclusive of time spent on separately billable procedures. Time includes review of laboratory data, radiology results, discussion with consultants, and monitoring for potential decompensation. Interventions were performed as documented above     I personally saw the patient with the resident, and completed the key components of the history and physical exam. I then discussed the management plan with the resident.

## 2023-07-29 NOTE — ED CLERICAL - NS ED CLERK UNITS
MICU 3102-01 [facility] 3102-01 [facility]

## 2023-07-29 NOTE — ED PROVIDER NOTE - PROGRESS NOTE DETAILS
Patient presented to ED with Mike duenas in cardiopulmonary arrest found in cardiac arrest by EMS it was a witnessed arrest by patient's family member that lives with him he was vomiting throughout the day suddenly collapsed they called 911 per EMS he had return of spontaneous circulation 2 times in the ambulance patient received 7  rounds of epinephrine in the field no epi given ED return of circulation was achieved patient came in intubated central line placed A-line placed OG tube placed spoke with family history of epilepsy and alcohol abuse OG tube showing gross blood severely acidotic I updated family in particular patient's mother on patient's severe critical condition pupils remain fixed cardiac ultrasound shows no pericardial effusion no gross hypokinesis EKG right bundle branch block no overt ST elevation patient continues to be critically ill will call MICU for consult when blood work is back Patient is showing signs of DIC has epistaxis left subclavian central line appears to be clogged right IJ central line placed giving blood remains hypotensive ICU at bedside

## 2023-07-29 NOTE — ED PROVIDER NOTE - PHYSICAL EXAMINATION
General: unresponsive  Head: NC, AT  EENT: 3mm pupils fixed b/l   Cardiac: no pulses, no cardac activity   Respiratory: respiratory arrest  Abdomen: softly distended abdomen, nonperitonitic  MSK/Vascular: cool cyanotic extremities  Neuro: Unresponsive, GCS 3, no withdrawal to noxious stimuli, no spontaneous movements

## 2023-07-29 NOTE — H&P ADULT - MENTAL STATUS
unresponsive to verbal or noxious stimuli, does not withdraw to pain, no cough/gag reflex, no spontaneous movements, DOES overbreathe ventilator

## 2023-07-29 NOTE — H&P ADULT - HISTORY OF PRESENT ILLNESS
41 y/o M with a h/o epilepsy, ETOH abuse, presents to the ED via EMS after experiencing sudden witnessed cardiac arrest at home. The patient's aunt's boyfriend reports that the patient had stopped drinking alcohol 2 days prior and had been experiencing recurrent vomiting episodes since, and today he suddenly collapsed prompting the 911 call. The aunt's boyfriend began CPR and EMS took over upon arrival. There was brief ROSC en route to [facility] however CPR/ACLS continued in ED with eventual ROSC. Total downtime approx 40 mins. Profound shock state now requiring triple IV vasopressor therapy. MSOF. DIC with active hemorrhage from multiple sites. Poor mental status off sedation. 39 y/o M with a h/o epilepsy, ETOH abuse, presents to the ED via EMS after experiencing sudden witnessed cardiac arrest at home. The patient's aunt's boyfriend reports that the patient had stopped drinking alcohol 2 days prior and had been experiencing recurrent vomiting episodes since, and today he suddenly collapsed prompting the 911 call. The aunt's boyfriend began CPR and EMS took over upon arrival. There was brief ROSC en route to [facility] however CPR/ACLS continued in ED with eventual ROSC. Total downtime approx 40 mins. Profound shock state now requiring triple IV vasopressor therapy. MSOF. DIC with active hemorrhage from multiple sites. Poor mental status off sedation.

## 2023-07-29 NOTE — DISCHARGE NOTE FOR THE EXPIRED PATIENT - HOSPITAL COURSE
39 yo m pmhx epilepsy and ETOH abuse biba s/p prolonged ooh cardiac arrest.  Patient had stopped drinking alcohol 2 days prior, had been vomiting at home, then suddenly collapsed at home, total down time ~40 minutes.  Patient admitted to MICU with hypoxic respiratory failure, profound triple pressor shock, multisystem organ failure, global anoxic brain injury, goc discussion held with patient's family, patient transitioned to DNR/no escalation of care.  This evening patient deteriorated to asystole and was pronounced.   37 yo m pmhx epilepsy and ETOH abuse biba s/p prolonged ooh cardiac arrest.  Patient had stopped drinking alcohol 2 days prior, had been vomiting at home, then suddenly collapsed at home, total down time ~40 minutes.  Patient admitted to MICU with hypoxic respiratory failure, profound triple pressor shock, multisystem organ failure, global anoxic brain injury, goc discussion held with patient's family, patient transitioned to DNR/no escalation of care.  This evening patient deteriorated to asystole and was pronounced.

## 2023-07-29 NOTE — ED ADULT TRIAGE NOTE - CHIEF COMPLAINT QUOTE
Pt. BIBA in cardiac arrest. Pt. was found by family, was fine earlier today. Pt. received 8 epis in field, ROSC achieved x2, lost pulse PTA. PT. intubated in field, jace in place, IO left humerus.  Pt. brought to CC, MD Gupta at bedside.

## 2023-07-29 NOTE — PROGRESS NOTE ADULT - CRITICAL CARE ATTENDING COMMENT
Critical care time spent titrating IV sedatives, vasoactive medications, adjusting mechanical ventilator settings, interpreting blood gases and chest imaging.

## 2023-07-29 NOTE — ED ADULT NURSE NOTE - OBJECTIVE STATEMENT
Pt. BIBA for cardiac arrest. Pt. found down by family, 8epis in field, ROSC x2, then ROSC on arrival to ER. Pt. intubated PTA, OG tube put in with dark and bloody GI contents. Unable to advance hansen. Pt. originally hypotensive and bradycardiac on ROSC. Hx of etoh abuse and seizures.

## 2023-07-29 NOTE — ED PROVIDER NOTE - OBJECTIVE STATEMENT
40-year-old male brought into EMS after witnessed cardiac arrest at home.  On route to hospital ROSC was achieved however however pulses were subsequently lost, requiring further CPR/ACLS.

## 2023-07-29 NOTE — H&P ADULT - NSHPSOCIALHISTORY_GEN_ALL_CORE
Has 2 children (12 and 17 y/o) with girlfriend who lives in [place]. Has 2 children (12 and 15 y/o) with girlfriend who lives in [place].

## 2023-07-29 NOTE — H&P ADULT - NS PANP COMMENT GEN_ALL_CORE FT
41 y/o M with history of epilepsy, was found down at home, cardiac arrest, intubated on the field, total down time > 40 minutes combined field time + ED time.  Patient now in multiple organ failure on 3 pressors max doses, profound acidemia despite multiple bicarb pushes and drip, CT head shows diffuse anoxic changes.  Labs also significant for DIC with fibrinogen down to 152, and has bleeding from multiple sites.  Lactate > 20 on blood gas.  Too unstable for HD.  Family updated, still full code for now.  prognosis is very poor at this time.  Will continue current measure and continue emotional support to the family.  Rest of plan per CASE [facility].

## 2023-07-29 NOTE — GOALS OF CARE CONVERSATION - ADVANCED CARE PLANNING - CONVERSATION DETAILS
Updated in detail on condition  Explained grim prognosis, that patient may die despite our interventions, and that CPR would be of not benefit if he arrested despite high dose pressors.  Agreed to DNR, no escalation.   Also explained that in the event he did survive, he would be left with permanent neurologic injury.

## 2023-07-29 NOTE — PATIENT PROFILE ADULT - NSPROPOAURINARYCATHETER_GEN_A_NUR
This note was copied from a baby's chart. Lactation Progress Note  Initial Consult    Data: Referral received per RN. Action: LC to room. Mother resting in bed. Infant sleeping, swaddled in mother's arms, showing no hunger cues at this time. Mother states agreeable to consult from [facility] at this time. I reviewed Care Plan for First 24 Hours of Life already in patient binder. Discussed recognizing hunger cues and offering the breast when cues are shown. Encouraged breastfeeding on demand and attempting/offering at least every 3 hours. Informed infant may have one 5 hour stretch of sleep in a 24 hour period. Encouraged unlimited skin to skin contact with infant and reviewed benefits including better temperature, heart rate, respiration, blood pressure, and blood sugar regulation. Also increased bonding and milk supply associated with skin to skin contact. Discussed feeding positions, latch on techniques, signs of milk transfer, output goals and normal feeding/sleeping behaviors. I referred mother to binder for additional information about breastfeeding and skin to skin contact. Discussed hand expression and encouraged mother to practice getting drops to infant today. Mother is also pumping after feedings to help establish milk supply. Mother has hx of low milk supply issues with previous child. Education and support given for mother's plan to breastfeed and pump. Reinforced importance of positioning infant nose to nipple, belly to belly, waiting for wide open mouth, and bringing baby onto breast to ensure a deep latch. Discussed importance of obtaining deep latch to ensure proper milk transfer, milk production and supply and maternal comfort. Mother already has a new breast pump for home use. Gave breastfeeding booklet along with additional resources for after discharge.     I wrote my name and circled the phone number on patient's whiteboard, provided a lactation consultant business card, directed mother to [facility] for evidence based information, and encouraged mother to call with any lactation needs. Response: Mother verbalizes understanding of information given and denies further needs at this time. no

## 2023-07-29 NOTE — PATIENT PROFILE ADULT - FALL HARM RISK - HARM RISK INTERVENTIONS
Assistance with ambulation/Assistance OOB with selected safe patient handling equipment/Communicate Risk of Fall with Harm to all staff/Reinforce activity limits and safety measures with patient and family/Tailored Fall Risk Interventions/Visual Cue: Yellow wristband and red socks/Bed in lowest position, wheels locked, appropriate side rails in place/Call bell, personal items and telephone in reach/Instruct patient to call for assistance before getting out of bed or chair/Non-slip footwear when patient is out of bed/[place] to call system/Physically safe environment - no spills, clutter or unnecessary equipment/Purposeful Proactive Rounding/Room/bathroom lighting operational, light cord in reach Assistance with ambulation/Assistance OOB with selected safe patient handling equipment/Communicate Risk of Fall with Harm to all staff/Reinforce activity limits and safety measures with patient and family/Tailored Fall Risk Interventions/Visual Cue: Yellow wristband and red socks/Bed in lowest position, wheels locked, appropriate side rails in place/Call bell, personal items and telephone in reach/Instruct patient to call for assistance before getting out of bed or chair/Non-slip footwear when patient is out of bed/[place] to call system/Physically safe environment - no spills, clutter or unnecessary equipment/Purposeful Proactive Rounding/Room/bathroom lighting operational, light cord in reach

## 2023-07-29 NOTE — INITIAL ORGAN DONATION REFERRAL - NSORGANDONATIONCLINICALTRIGGER_GEN_ALL_CORE
[place] Coma Scale is less than or equal to 5/Family discussion withdrawal of life-sustaining therapies is anticipated [place] Coma Scale is less than or equal to 5/Family discussion withdrawal of life-sustaining therapies is anticipated

## 2023-07-29 NOTE — ED PROCEDURE NOTE - CPROC ED TIME OUT STATEMENT1
“Patient's name, , procedure and correct site were confirmed during the [place] Timeout.” “Patient's name, , procedure and correct site were confirmed during the [place] Timeout.”

## 2023-07-29 NOTE — H&P ADULT - ASSESSMENT
39 y/o M with a h/o epilepsy, ETOH abuse, with:    # Cardiac arrest  # Distributive shock  # DIC  # LORI  # Lactic acidosis    Admit to MICU.    Unclear etiology of cardiac arrest. Story is a bit unclear, however sounds rather sudden. Possible alcohol withdrawal seizure as patient stopped daily drinking 2 days prior? Check urine toxicology.    - unofficial bedside POCUS appears to reveal hyperdynamic LVEF, shock state likely related to massive SIRS response s/p prolonged cardiac arrest  - actively titrating norepi and epi infusions to maintain a MAP > 65  - maintain fixed dose vasopressin  - lactate > 20  - bolus 1L LR x 1  - transfused 2u PRBC emergently and will give 1 pool of cryoprecipitate now (fibrinogen 152)  - hold anticoagulation  - s/p numerous amps of sodium bicarbonate for metabolic acidosis, start infusion  - LORI secondary to ischemic ATN, optimize end-organ perfusion as above  - trend BUN/Cr, electrolytes, acid-base balance, and monitor hourly UOP  - no indication for urgent RRT and would likely be a poor candidate given profound hemodynamic instability  - actively titrating ventilator settings to maintain SpO2 > 92% and adequate minute ventilation  - RR increased to 28 bpm for compensatory purposes  - CT chest reveals bibasilar lung infiltrates/atelectasis, CT brain/chest/abdomen/pelvis formal report pending  - blood cultures sent, start empiric course of Zosyn for now  - strong suspicion for anoxic brain injury, no TTM in setting of active bleeding and severe acidosis, avoid hyperthermia  - avoid sedation as best as possible to accurately assess neuro exam    Discussed case extensively with the patient's mother, aunt, and aunt's boyfriend. I explained the gravity of the situation and the tremendous amount of life support he is requiring. They understand he is at high risk for recurrent cardiac arrest and overall grim prognosis for meaningful recovery. The patient's mother is unable to decide on goals of care at this time as she is overwhelmed and beginning to not feel well. Will proceed without limitations to care.    Case discussed with MICU physician, Dr. Keen.        CRITICAL CARE TIME SPENT: 120 mins  Time spent evaluating/treating patient with medical issues that pose a high risk for life threatening deterioration and/or end-organ damage, reviewing data/labs/imaging, discussing case with multidisciplinary team, discussing plan/goals of care with patient/family. Non-inclusive of procedure time.     41 y/o M with a h/o epilepsy, ETOH abuse, with:    # Cardiac arrest  # Distributive shock  # DIC  # LORI  # Lactic acidosis    Admit to MICU.    Unclear etiology of cardiac arrest. Story is a bit unclear, however sounds rather sudden. Possible alcohol withdrawal seizure as patient stopped daily drinking 2 days prior? Check urine toxicology.    - unofficial bedside POCUS appears to reveal hyperdynamic LVEF, shock state likely related to massive SIRS response s/p prolonged cardiac arrest  - actively titrating norepi and epi infusions to maintain a MAP > 65  - maintain fixed dose vasopressin  - lactate > 20  - bolus 1L LR x 1  - transfused 2u PRBC emergently and will give 1 pool of cryoprecipitate now (fibrinogen 152)  - hold anticoagulation  - s/p numerous amps of sodium bicarbonate for metabolic acidosis, start infusion  - LORI secondary to ischemic ATN, optimize end-organ perfusion as above  - trend BUN/Cr, electrolytes, acid-base balance, and monitor hourly UOP  - no indication for urgent RRT and would likely be a poor candidate given profound hemodynamic instability  - actively titrating ventilator settings to maintain SpO2 > 92% and adequate minute ventilation  - RR increased to 28 bpm for compensatory purposes  - CT chest reveals bibasilar lung infiltrates/atelectasis, CT brain/chest/abdomen/pelvis formal report pending  - blood cultures sent, start empiric course of Zosyn for now  - strong suspicion for anoxic brain injury, no TTM in setting of active bleeding and severe acidosis, avoid hyperthermia  - avoid sedation as best as possible to accurately assess neuro exam    Discussed case extensively with the patient's mother, aunt, and aunt's boyfriend. I explained the gravity of the situation and the tremendous amount of life support he is requiring. They understand he is at high risk for recurrent cardiac arrest and overall grim prognosis for meaningful recovery. The patient's mother is unable to decide on goals of care at this time as she is overwhelmed and beginning to not feel well. Will proceed without limitations to care.    Case discussed with MICU physician, Dr. Keen.        CRITICAL CARE TIME SPENT: 120 mins  Time spent evaluating/treating patient with medical issues that pose a high risk for life threatening deterioration and/or end-organ damage, reviewing data/labs/imaging, discussing case with multidisciplinary team, discussing plan/goals of care with patient/family. Non-inclusive of procedure time.

## 2023-07-29 NOTE — ED PROCEDURE NOTE - CPROC ED INDICATIONS1
emergency venous access/volume resuscitation
emergency venous access/volume resuscitation
arterial puncture to obtain ABG's/critical patient/monitoring purposes

## 2023-07-29 NOTE — ED PROVIDER NOTE - CLINICAL SUMMARY MEDICAL DECISION MAKING FREE TEXT BOX
40-year-old male brought in by EMS found unresponsive at home.  Chart review shows patient's name is Avinash Vincent, hx of alcohol abuse and seizures.  ROSC achieved after 2 rounds of CPR. Pt intubated prior to arrival. OG tube placed shows coffee-ground emesis.  Concern for GI bleed.    Likely has anoxic brain injury secondary to extended time down. Subclavian central line, right radial A-line placed for hemodynamic monitoring. MICU consulted, prognosis very poor.

## 2023-07-29 NOTE — ED PROCEDURE NOTE - CPROC ED INFUS LINE DETAIL1
The location was identified, and the area was draped and prepped./The catheter was placed using sterile technique./Ultrasound guidance was used./The guidewire was recovered./All lumen(s) aspirated and flushed without difficulty.
The location was identified, and the area was draped and prepped./The catheter was placed using sterile technique./The guidewire was recovered./All lumen(s) aspirated and flushed without difficulty.

## 2023-07-29 NOTE — PROGRESS NOTE ADULT - ASSESSMENT
37 yo male with hx of seizures, ETOH abuse, admitted with an out of hospital cardiac arrest, recent vomiting likely due to pancreatitis.  Patient now with anoxic encephalopathy, acute respiratory failure, LORI, DIC, metabolic acidosis.    Patient is actively dying despite all of our interventions.  Etiology of arrest ins unclear, however he has pancreatitis on CT and reportedly stopped drinking several days prior to presentation, may have had an ETOH withdrawal seizure.      Plan    Shock  - currently on high dose norepi, epi, fixed dose vaso, increasing any further would be futile  -  appears to have some upper GI bleed, but Hb is fairly stable and this does not appear to be hemorrhagic shock  - BP progressively dropping    Acute respiratory failure  - lung protective ventilation with low Vt and high RR to compensate for acidosis  - requiring high fio2  - empiric abx for aspiration     Anoxic encephalopathy secondary to prolonged cardiac arrest  - global injury pattern on CT  - poor neurologic prognosis, will likely progress to brain death if cardiac survival    Sedation:  fentanyl drip for comfort  Family updated

## 2023-07-29 NOTE — DISCHARGE NOTE FOR THE EXPIRED PATIENT - SECONDARY DIAGNOSIS.
Anoxic brain damage Lactic acidosis Pancreatitis Hemorrhagic shock DIC syndrome LORI (acute kidney injury) Sepsis with acute hypoxic respiratory failure

## 2023-07-29 NOTE — ED ADULT NURSE NOTE - NSFALLRISKINTERV_ED_ALL_ED
Assistance OOB with selected safe patient handling equipment if applicable/Communicate fall risk and risk factors to all staff, patient, and family/Encourage patient to sit up slowly, dangle for a short time, stand at bedside before walking/Provide visual cue: yellow wristband, yellow gown, etc/Reinforce activity limits and safety measures with patient and family/Review medications for side effects contributing to fall risk/Toileting schedule using arm’s reach rule for commode and bathroom/Call bell, personal items and telephone in reach/Instruct patient to call for assistance before getting out of bed/chair/stretcher/Non-slip footwear applied when patient is off stretcher/[place] to call system/Physically safe environment - no spills, clutter or unnecessary equipment/Purposeful Proactive Rounding/Room/bathroom lighting operational, light cord in reach Assistance OOB with selected safe patient handling equipment if applicable/Communicate fall risk and risk factors to all staff, patient, and family/Encourage patient to sit up slowly, dangle for a short time, stand at bedside before walking/Provide visual cue: yellow wristband, yellow gown, etc/Reinforce activity limits and safety measures with patient and family/Review medications for side effects contributing to fall risk/Toileting schedule using arm’s reach rule for commode and bathroom/Call bell, personal items and telephone in reach/Instruct patient to call for assistance before getting out of bed/chair/stretcher/Non-slip footwear applied when patient is off stretcher/[place] to call system/Physically safe environment - no spills, clutter or unnecessary equipment/Purposeful Proactive Rounding/Room/bathroom lighting operational, light cord in reach

## 2023-07-29 NOTE — PROGRESS NOTE ADULT - SUBJECTIVE AND OBJECTIVE BOX
Interval HPI:  BP and acidosis worsening despite high dose pressors    Exam:  Unresponsive, no purposeful movements,  pupils fixed  intubated  bilat air entry  bleeding from multiple sites    Radiology: CT chest- aspiration pneumonia    On Admission  07-29-23  HPI:  41 y/o M with a h/o epilepsy, ETOH abuse, presents to the ED via EMS after experiencing sudden witnessed cardiac arrest at home. The patient's aunt's boyfriend reports that the patient had stopped drinking alcohol 2 days prior and had been experiencing recurrent vomiting episodes since, and today he suddenly collapsed prompting the 911 call. The aunt's boyfriend began CPR and EMS took over upon arrival. There was brief ROSC en route to [facility] however CPR/ACLS continued in ED with eventual ROSC. Total downtime approx 40 mins. Profound shock state now requiring triple IV vasopressor therapy. MSOF. DIC with active hemorrhage from multiple sites. Poor mental status off sedation. (29 Jul 2023 03:44)    PAST MEDICAL & SURGICAL HISTORY:  ETOH abuse      Epilepsy          Antimicrobial:  piperacillin/tazobactam IVPB.. 3.375 Gram(s) IV Intermittent every 8 hours    Cardiovascular:  EPINEPHrine    Infusion 0.3 MICROgram(s)/kG/Min IV Continuous <Continuous>  norepinephrine Infusion 0.05 MICROgram(s)/kG/Min IV Continuous <Continuous>    Pulmonary:    Hematalogic:    Other:  chlorhexidine 0.12% Liquid 15 milliLiter(s) Oral Mucosa every 12 hours  chlorhexidine 2% Cloths 1 Application(s) Topical <User Schedule>  fentaNYL   Infusion. 0.5 MICROgram(s)/kG/Hr IV Continuous <Continuous>  levETIRAcetam  IVPB 1000 milliGRAM(s) IV Intermittent every 12 hours  pantoprazole  Injectable 40 milliGRAM(s) IV Push Once  vasopressin Infusion 0.04 Unit(s)/Min IV Continuous <Continuous>      Drug Dosing Weight    Weight (kg): 70 (29 Jul 2023 00:09)    T(C): 35.5 (07-29-23 @ 16:00), Max: 37 (07-29-23 @ 07:15)  HR: 100 (07-29-23 @ 16:15)  BP: 63/50 (07-29-23 @ 15:30)  BP(mean): 56 (07-29-23 @ 15:30)  ABP: 42/21 (07-29-23 @ 16:15)  ABP(mean): 29 (07-29-23 @ 16:15)  RR: 20 (07-29-23 @ 16:15)  SpO2: 100% (07-29-23 @ 16:15)    ABG - ( 29 Jul 2023 02:43 )  pH, Arterial: 7.160 pH, Blood: x     /  pCO2: 30    /  pO2: 241   / HCO3: 11    / Base Excess: -18.0 /  SaO2: 100.0                 07-28 @ 07:01  -  07-29 @ 07:00  --------------------------------------------------------  IN: 2996 mL / OUT: 0 mL / NET: 2996 mL        Mode: AC/ CMV (Assist Control/ Continuous Mandatory Ventilation)  RR (machine): 20  TV (machine): 500  FiO2: 90  PEEP: 6  MAP: 13  PIP: 35        LABS:  CBC Full  -  ( 29 Jul 2023 08:55 )  WBC Count : 36.06 K/uL  RBC Count : 3.24 M/uL  Hemoglobin : 11.0 g/dL  Hematocrit : 34.6 %  Platelet Count - Automated : 174 K/uL  Mean Cell Volume : 106.8 fl  Mean Cell Hemoglobin : 34.0 pg  Mean Cell Hemoglobin Concentration : 31.8 gm/dL  Auto Neutrophil # : 29.35 K/uL  Auto Lymphocyte # : 3.07 K/uL  Auto Monocyte # : 1.51 K/uL  Auto Eosinophil # : 0.90 K/uL  Auto Basophil # : 0.00 K/uL  Auto Neutrophil % : 79.7 %  Auto Lymphocyte % : 8.5 %  Auto Monocyte % : 4.2 %  Auto Eosinophil % : 2.5 %  Auto Basophil % : 0.0 %    07-29    140  |  85<L>  |  18.4  ----------------------------<  440<H>  3.4<L>   |  <6.0<LL>  |  4.11<H>    Ca    6.2<LL>      29 Jul 2023 08:55  Phos  13.0     07-29  Mg     4.3     07-29    TPro  4.0<L>  /  Alb  2.1<L>  /  TBili  2.2<H>  /  DBili  x   /  AST  1113<H>  /  ALT  257<H>  /  AlkPhos  493<H>  07-29    PT/INR - ( 29 Jul 2023 08:55 )   PT: 37.9 sec;   INR: 3.54 ratio         PTT - ( 29 Jul 2023 08:55 )  PTT:93.7 sec  Urinalysis Basic - ( 29 Jul 2023 08:55 )    Color: x / Appearance: x / SG: x / pH: x  Gluc: 440 mg/dL / Ketone: x  / Bili: x / Urobili: x   Blood: x / Protein: x / Nitrite: x   Leuk Esterase: x / RBC: x / WBC x   Sq Epi: x / Non Sq Epi: x / Bacteria: x        ____________________________________________________________________________________________________   Interval HPI:  BP and acidosis worsening despite high dose pressors    Exam:  Unresponsive, no purposeful movements,  pupils fixed  intubated  bilat air entry  bleeding from multiple sites    Radiology: CT chest- aspiration pneumonia    On Admission  07-29-23  HPI:  39 y/o M with a h/o epilepsy, ETOH abuse, presents to the ED via EMS after experiencing sudden witnessed cardiac arrest at home. The patient's aunt's boyfriend reports that the patient had stopped drinking alcohol 2 days prior and had been experiencing recurrent vomiting episodes since, and today he suddenly collapsed prompting the 911 call. The aunt's boyfriend began CPR and EMS took over upon arrival. There was brief ROSC en route to [facility] however CPR/ACLS continued in ED with eventual ROSC. Total downtime approx 40 mins. Profound shock state now requiring triple IV vasopressor therapy. MSOF. DIC with active hemorrhage from multiple sites. Poor mental status off sedation. (29 Jul 2023 03:44)    PAST MEDICAL & SURGICAL HISTORY:  ETOH abuse      Epilepsy          Antimicrobial:  piperacillin/tazobactam IVPB.. 3.375 Gram(s) IV Intermittent every 8 hours    Cardiovascular:  EPINEPHrine    Infusion 0.3 MICROgram(s)/kG/Min IV Continuous <Continuous>  norepinephrine Infusion 0.05 MICROgram(s)/kG/Min IV Continuous <Continuous>    Pulmonary:    Hematalogic:    Other:  chlorhexidine 0.12% Liquid 15 milliLiter(s) Oral Mucosa every 12 hours  chlorhexidine 2% Cloths 1 Application(s) Topical <User Schedule>  fentaNYL   Infusion. 0.5 MICROgram(s)/kG/Hr IV Continuous <Continuous>  levETIRAcetam  IVPB 1000 milliGRAM(s) IV Intermittent every 12 hours  pantoprazole  Injectable 40 milliGRAM(s) IV Push Once  vasopressin Infusion 0.04 Unit(s)/Min IV Continuous <Continuous>      Drug Dosing Weight    Weight (kg): 70 (29 Jul 2023 00:09)    T(C): 35.5 (07-29-23 @ 16:00), Max: 37 (07-29-23 @ 07:15)  HR: 100 (07-29-23 @ 16:15)  BP: 63/50 (07-29-23 @ 15:30)  BP(mean): 56 (07-29-23 @ 15:30)  ABP: 42/21 (07-29-23 @ 16:15)  ABP(mean): 29 (07-29-23 @ 16:15)  RR: 20 (07-29-23 @ 16:15)  SpO2: 100% (07-29-23 @ 16:15)    ABG - ( 29 Jul 2023 02:43 )  pH, Arterial: 7.160 pH, Blood: x     /  pCO2: 30    /  pO2: 241   / HCO3: 11    / Base Excess: -18.0 /  SaO2: 100.0                 07-28 @ 07:01  -  07-29 @ 07:00  --------------------------------------------------------  IN: 2996 mL / OUT: 0 mL / NET: 2996 mL        Mode: AC/ CMV (Assist Control/ Continuous Mandatory Ventilation)  RR (machine): 20  TV (machine): 500  FiO2: 90  PEEP: 6  MAP: 13  PIP: 35        LABS:  CBC Full  -  ( 29 Jul 2023 08:55 )  WBC Count : 36.06 K/uL  RBC Count : 3.24 M/uL  Hemoglobin : 11.0 g/dL  Hematocrit : 34.6 %  Platelet Count - Automated : 174 K/uL  Mean Cell Volume : 106.8 fl  Mean Cell Hemoglobin : 34.0 pg  Mean Cell Hemoglobin Concentration : 31.8 gm/dL  Auto Neutrophil # : 29.35 K/uL  Auto Lymphocyte # : 3.07 K/uL  Auto Monocyte # : 1.51 K/uL  Auto Eosinophil # : 0.90 K/uL  Auto Basophil # : 0.00 K/uL  Auto Neutrophil % : 79.7 %  Auto Lymphocyte % : 8.5 %  Auto Monocyte % : 4.2 %  Auto Eosinophil % : 2.5 %  Auto Basophil % : 0.0 %    07-29    140  |  85<L>  |  18.4  ----------------------------<  440<H>  3.4<L>   |  <6.0<LL>  |  4.11<H>    Ca    6.2<LL>      29 Jul 2023 08:55  Phos  13.0     07-29  Mg     4.3     07-29    TPro  4.0<L>  /  Alb  2.1<L>  /  TBili  2.2<H>  /  DBili  x   /  AST  1113<H>  /  ALT  257<H>  /  AlkPhos  493<H>  07-29    PT/INR - ( 29 Jul 2023 08:55 )   PT: 37.9 sec;   INR: 3.54 ratio         PTT - ( 29 Jul 2023 08:55 )  PTT:93.7 sec  Urinalysis Basic - ( 29 Jul 2023 08:55 )    Color: x / Appearance: x / SG: x / pH: x  Gluc: 440 mg/dL / Ketone: x  / Bili: x / Urobili: x   Blood: x / Protein: x / Nitrite: x   Leuk Esterase: x / RBC: x / WBC x   Sq Epi: x / Non Sq Epi: x / Bacteria: x        ____________________________________________________________________________________________________

## 2023-07-29 NOTE — ED PROVIDER NOTE - CARE PLAN
Principal Discharge DX:	Cardiopulmonary arrest with successful resuscitation  Secondary Diagnosis:	Hemorrhagic shock   1